# Patient Record
Sex: FEMALE | Race: WHITE | NOT HISPANIC OR LATINO | Employment: UNEMPLOYED | URBAN - METROPOLITAN AREA
[De-identification: names, ages, dates, MRNs, and addresses within clinical notes are randomized per-mention and may not be internally consistent; named-entity substitution may affect disease eponyms.]

---

## 2021-05-13 ENCOUNTER — HOSPITAL ENCOUNTER (EMERGENCY)
Facility: HOSPITAL | Age: 56
Discharge: HOME | End: 2021-05-13
Attending: EMERGENCY MEDICINE
Payer: MEDICAID

## 2021-05-13 VITALS
HEART RATE: 80 BPM | RESPIRATION RATE: 18 BRPM | DIASTOLIC BLOOD PRESSURE: 74 MMHG | HEIGHT: 64 IN | BODY MASS INDEX: 15.36 KG/M2 | TEMPERATURE: 98 F | OXYGEN SATURATION: 99 % | WEIGHT: 90 LBS | SYSTOLIC BLOOD PRESSURE: 165 MMHG

## 2021-05-13 DIAGNOSIS — K52.9 GASTROENTERITIS: Primary | ICD-10-CM

## 2021-05-13 DIAGNOSIS — I10 HTN (HYPERTENSION): ICD-10-CM

## 2021-05-13 DIAGNOSIS — R07.9 CHEST PAIN: ICD-10-CM

## 2021-05-13 DIAGNOSIS — M79.89 LEG SWELLING: ICD-10-CM

## 2021-05-13 PROBLEM — J44.1 COPD EXACERBATION: Status: ACTIVE | Noted: 2019-08-16

## 2021-05-13 PROBLEM — C50.511 MALIGNANT NEOPLASM OF LOWER-OUTER QUADRANT OF RIGHT FEMALE BREAST: Status: ACTIVE | Noted: 2018-10-20

## 2021-05-13 LAB
ALBUMIN SERPL-MCNC: 3.9 G/DL (ref 3.3–5.5)
ALBUMIN SERPL-MCNC: 4 G/DL (ref 3.3–5.5)
ALP SERPL-CCNC: 80 U/L (ref 42–141)
ALP SERPL-CCNC: 81 U/L (ref 42–141)
BILIRUB SERPL-MCNC: 0.3 MG/DL (ref 0.2–1.6)
BILIRUB SERPL-MCNC: 0.3 MG/DL (ref 0.2–1.6)
BUN SERPL-MCNC: 16 MG/DL (ref 7–22)
CALCIUM SERPL-MCNC: 11.6 MG/DL (ref 8–10.3)
CHLORIDE SERPL-SCNC: 103 MMOL/L (ref 98–108)
CREAT SERPL-MCNC: 1 MG/DL (ref 0.6–1.2)
CTP QC/QA: YES
GLUCOSE SERPL-MCNC: 104 MG/DL (ref 73–118)
POC ALT (SGPT): 14 U/L (ref 10–47)
POC ALT (SGPT): 7 U/L (ref 10–47)
POC AMYLASE: 31 U/L (ref 14–97)
POC AST (SGOT): 32 U/L (ref 11–38)
POC AST (SGOT): 37 U/L (ref 11–38)
POC B-TYPE NATRIURETIC PEPTIDE: 132 PG/ML (ref 0–100)
POC CARDIAC TROPONIN I: 0 NG/ML
POC CARDIAC TROPONIN I: 0.01 NG/ML
POC GGT: 34 U/L (ref 5–65)
POC PTINR: 1 (ref 0.9–1.2)
POC PTWBT: 12.1 SEC (ref 9.7–14.3)
POC TCO2: 32 MMOL/L (ref 18–33)
POTASSIUM BLD-SCNC: 4.5 MMOL/L (ref 3.6–5.1)
PROTEIN, POC: 6.6 G/DL (ref 6.4–8.1)
PROTEIN, POC: 7 G/DL (ref 6.4–8.1)
SAMPLE: NORMAL
SARS-COV-2 RDRP RESP QL NAA+PROBE: NEGATIVE
SODIUM BLD-SCNC: 141 MMOL/L (ref 128–145)

## 2021-05-13 PROCEDURE — 93005 ELECTROCARDIOGRAM TRACING: CPT | Mod: ER

## 2021-05-13 PROCEDURE — 80053 COMPREHEN METABOLIC PANEL: CPT | Mod: ER

## 2021-05-13 PROCEDURE — 96374 THER/PROPH/DIAG INJ IV PUSH: CPT | Mod: ER

## 2021-05-13 PROCEDURE — 93010 ELECTROCARDIOGRAM REPORT: CPT | Mod: ,,, | Performed by: INTERNAL MEDICINE

## 2021-05-13 PROCEDURE — 93010 EKG 12-LEAD: ICD-10-PCS | Mod: ,,, | Performed by: INTERNAL MEDICINE

## 2021-05-13 PROCEDURE — 83880 ASSAY OF NATRIURETIC PEPTIDE: CPT | Mod: ER

## 2021-05-13 PROCEDURE — 84484 ASSAY OF TROPONIN QUANT: CPT | Mod: ER

## 2021-05-13 PROCEDURE — 82150 ASSAY OF AMYLASE: CPT | Mod: ER

## 2021-05-13 PROCEDURE — U0002 COVID-19 LAB TEST NON-CDC: HCPCS | Mod: ER | Performed by: EMERGENCY MEDICINE

## 2021-05-13 PROCEDURE — 99285 EMERGENCY DEPT VISIT HI MDM: CPT | Mod: 25,ER

## 2021-05-13 PROCEDURE — 85610 PROTHROMBIN TIME: CPT | Mod: ER

## 2021-05-13 PROCEDURE — 25000242 PHARM REV CODE 250 ALT 637 W/ HCPCS: Mod: ER | Performed by: EMERGENCY MEDICINE

## 2021-05-13 PROCEDURE — 25000003 PHARM REV CODE 250: Mod: ER | Performed by: EMERGENCY MEDICINE

## 2021-05-13 PROCEDURE — 25500020 PHARM REV CODE 255: Mod: ER | Performed by: EMERGENCY MEDICINE

## 2021-05-13 PROCEDURE — 85025 COMPLETE CBC W/AUTO DIFF WBC: CPT | Mod: ER

## 2021-05-13 PROCEDURE — 63600175 PHARM REV CODE 636 W HCPCS: Mod: ER | Performed by: EMERGENCY MEDICINE

## 2021-05-13 PROCEDURE — 81003 URINALYSIS AUTO W/O SCOPE: CPT | Mod: ER

## 2021-05-13 RX ORDER — FAMOTIDINE 20 MG/1
20 TABLET, FILM COATED ORAL 2 TIMES DAILY
Qty: 20 TABLET | Refills: 0 | OUTPATIENT
Start: 2021-05-13 | End: 2022-02-22

## 2021-05-13 RX ORDER — ASPIRIN 325 MG
325 TABLET ORAL
Status: COMPLETED | OUTPATIENT
Start: 2021-05-13 | End: 2021-05-13

## 2021-05-13 RX ORDER — ACETAMINOPHEN 500 MG
1000 TABLET ORAL EVERY 6 HOURS PRN
Qty: 30 TABLET | Refills: 0 | OUTPATIENT
Start: 2021-05-13 | End: 2022-02-22

## 2021-05-13 RX ORDER — HYDRALAZINE HYDROCHLORIDE 20 MG/ML
10 INJECTION INTRAMUSCULAR; INTRAVENOUS
Status: COMPLETED | OUTPATIENT
Start: 2021-05-13 | End: 2021-05-13

## 2021-05-13 RX ORDER — IBUPROFEN 600 MG/1
600 TABLET ORAL EVERY 6 HOURS PRN
Qty: 20 TABLET | Refills: 0 | OUTPATIENT
Start: 2021-05-13 | End: 2022-02-22

## 2021-05-13 RX ORDER — NITROGLYCERIN 0.4 MG/1
0.4 TABLET SUBLINGUAL EVERY 5 MIN PRN
Status: DISCONTINUED | OUTPATIENT
Start: 2021-05-13 | End: 2021-05-13 | Stop reason: HOSPADM

## 2021-05-13 RX ORDER — ONDANSETRON 8 MG/1
8 TABLET, ORALLY DISINTEGRATING ORAL EVERY 6 HOURS PRN
Qty: 20 TABLET | Refills: 0 | Status: SHIPPED | OUTPATIENT
Start: 2021-05-13 | End: 2021-05-15

## 2021-05-13 RX ORDER — DICYCLOMINE HYDROCHLORIDE 20 MG/1
20 TABLET ORAL 3 TIMES DAILY PRN
Qty: 20 TABLET | Refills: 0 | Status: SHIPPED | OUTPATIENT
Start: 2021-05-13 | End: 2021-06-12

## 2021-05-13 RX ORDER — PROMETHAZINE HYDROCHLORIDE 25 MG/1
25 SUPPOSITORY RECTAL EVERY 6 HOURS PRN
Qty: 10 SUPPOSITORY | Refills: 0 | OUTPATIENT
Start: 2021-05-13 | End: 2022-02-22

## 2021-05-13 RX ADMIN — IOHEXOL 100 ML: 350 INJECTION, SOLUTION INTRAVENOUS at 12:05

## 2021-05-13 RX ADMIN — HYDRALAZINE HYDROCHLORIDE 10 MG: 20 INJECTION INTRAMUSCULAR; INTRAVENOUS at 02:05

## 2021-05-13 RX ADMIN — NITROGLYCERIN 0.4 MG: 0.4 TABLET, ORALLY DISINTEGRATING SUBLINGUAL at 11:05

## 2021-05-13 RX ADMIN — NITROGLYCERIN 1 INCH: 20 OINTMENT TOPICAL at 01:05

## 2021-05-13 RX ADMIN — ASPIRIN 325 MG ORAL TABLET 325 MG: 325 PILL ORAL at 10:05

## 2021-05-13 RX ADMIN — NITROGLYCERIN 0.4 MG: 0.4 TABLET, ORALLY DISINTEGRATING SUBLINGUAL at 10:05

## 2022-02-22 ENCOUNTER — HOSPITAL ENCOUNTER (EMERGENCY)
Facility: HOSPITAL | Age: 57
Discharge: HOME | End: 2022-02-22
Attending: EMERGENCY MEDICINE
Payer: MEDICARE

## 2022-02-22 VITALS
RESPIRATION RATE: 20 BRPM | DIASTOLIC BLOOD PRESSURE: 83 MMHG | TEMPERATURE: 98 F | BODY MASS INDEX: 17.07 KG/M2 | HEART RATE: 102 BPM | OXYGEN SATURATION: 97 % | HEIGHT: 64 IN | SYSTOLIC BLOOD PRESSURE: 162 MMHG | WEIGHT: 100 LBS

## 2022-02-22 DIAGNOSIS — J30.9 ALLERGIC RHINITIS, UNSPECIFIED SEASONALITY, UNSPECIFIED TRIGGER: ICD-10-CM

## 2022-02-22 DIAGNOSIS — J02.9 SORE THROAT: ICD-10-CM

## 2022-02-22 DIAGNOSIS — B34.9 VIRAL ILLNESS: Primary | ICD-10-CM

## 2022-02-22 LAB
CTP QC/QA: YES
INFLUENZA A ANTIGEN, POC: NEGATIVE
INFLUENZA B ANTIGEN, POC: NEGATIVE
POC RAPID STREP A: NEGATIVE
POC RAPID STREP A: NEGATIVE
SARS-COV-2 RDRP RESP QL NAA+PROBE: NEGATIVE

## 2022-02-22 PROCEDURE — U0002 COVID-19 LAB TEST NON-CDC: HCPCS | Mod: ER | Performed by: PHYSICIAN ASSISTANT

## 2022-02-22 PROCEDURE — 99284 EMERGENCY DEPT VISIT MOD MDM: CPT | Mod: 25,ER

## 2022-02-22 PROCEDURE — 87804 INFLUENZA ASSAY W/OPTIC: CPT | Mod: ER

## 2022-02-22 RX ORDER — IBUPROFEN 600 MG/1
600 TABLET ORAL EVERY 6 HOURS PRN
Qty: 20 TABLET | Refills: 0 | Status: SHIPPED | OUTPATIENT
Start: 2022-02-22 | End: 2022-02-27

## 2022-02-22 RX ORDER — LORATADINE 10 MG/1
10 TABLET ORAL DAILY
Qty: 30 TABLET | Refills: 0 | Status: SHIPPED | OUTPATIENT
Start: 2022-02-22 | End: 2022-03-24

## 2022-02-22 RX ORDER — DEXTROMETHORPHAN HYDROBROMIDE, GUAIFENESIN 5; 100 MG/5ML; MG/5ML
650 LIQUID ORAL EVERY 8 HOURS
Qty: 20 TABLET | Refills: 0 | Status: SHIPPED | OUTPATIENT
Start: 2022-02-22 | End: 2022-02-27

## 2022-02-22 RX ORDER — FLUTICASONE PROPIONATE 50 MCG
1 SPRAY, SUSPENSION (ML) NASAL 2 TIMES DAILY PRN
Qty: 15 G | Refills: 0 | Status: SHIPPED | OUTPATIENT
Start: 2022-02-22 | End: 2022-03-08

## 2022-02-22 NOTE — FIRST PROVIDER EVALUATION
Emergency Department TeleTriage Encounter Note      CHIEF COMPLAINT    Chief Complaint   Patient presents with    Sore Throat     X 1 week, sinus, occasional cough       VITAL SIGNS   Initial Vitals [02/22/22 1724]   BP Pulse Resp Temp SpO2   (!) 182/97 (!) 114 20 98.6 °F (37 °C) 95 %      MAP       --            ALLERGIES    Review of patient's allergies indicates:  No Known Allergies    PROVIDER TRIAGE NOTE  This is a teletriage evaluation of a 56 y.o. female presenting to the ED complaining of sore throat, cough, congestion x 1 week.  She denies fever.  She is vaccinated for covid and did get a flu shot.     Initial orders will be placed and care will be transferred to an alternate provider when patient is roomed for a full evaluation. Any additional orders and the final disposition will be determined by that provider.           ORDERS  Labs Reviewed - No data to display    ED Orders (720h ago, onward)    Start Ordered     Status Ordering Provider    02/22/22 1739 02/22/22 1738  POCT COVID-19 Rapid Screening  Once         Ordered WILBERT VANCE    02/22/22 1739 02/22/22 1738  Airborne and Contact and Droplet Isolation Status  Continuous         Ordered WILBERT VANCE    02/22/22 1739 02/22/22 1738  POCT Influenza A/B Molecular  Once         Ordered WILBERT VANCE            Virtual Visit Note: The provider triage portion of this emergency department evaluation and documentation was performed via [employer], a HIPAA-compliant telemedicine application, in concert with a tele-presenter in the room. A face to face patient evaluation with one of my colleagues will occur once the patient is placed in an emergency department room.      DISCLAIMER: This note was prepared with [employer] voice recognition transcription software. Garbled syntax, mangled pronouns, and other bizarre constructions may be attributed to that software system.

## 2022-02-23 NOTE — ED PROVIDER NOTES
Encounter Date: 2/22/2022    SCRIBE #1 NOTE: I, Jenna Lieberman, am scribing for, and in the presence of,  RODOLFO Shanks. I have scribed the following portions of the note - Other sections scribed: HPI; ROS; PE.       History     Chief Complaint   Patient presents with    Sore Throat     X 1 week, sinus, occasional cough     AMAN AGRAWAL is a 56 y.o. female with Hx of HTN, fibromyalgia, and breast cancer who presents to the ED for chief complaint of sore throat onset 1 week. Patient denies rash, fever, chest pain, SOB, numbness, weakness, tingling, abdominal pain, back pain, dysuria, hematuria, nausea, vomiting, diarrhea, or any other complaints.  Patient rates the pain as 4/10 and has not taken any medications for the symptoms. No known drug allergies. She denies tobacco use, alcohol use, or recreational drug use. No alleviating/aggravating factors.      The history is provided by the patient. No  was used.     Review of patient's allergies indicates:  No Known Allergies  Past Medical History:   Diagnosis Date    Breast cancer     Fibromyalgia     Hypertension      Past Surgical History:   Procedure Laterality Date    BREAST BIOPSY      BREAST CYST EXCISION      CERVICAL BIOPSY  W/ LOOP ELECTRODE EXCISION       Family History   Problem Relation Age of Onset    Breast cancer Neg Hx     Colon cancer Neg Hx     Ovarian cancer Neg Hx      Social History     Tobacco Use    Smoking status: Light Tobacco Smoker     Types: Cigarettes    Smokeless tobacco: Never Used   Substance Use Topics    Alcohol use: No    Drug use: No     Review of Systems   Constitutional: Negative for chills and fever.   HENT: Positive for sore throat. Negative for congestion, ear pain, rhinorrhea and trouble swallowing.    Eyes: Negative for pain, discharge and redness.   Respiratory: Negative for cough and shortness of breath.    Cardiovascular: Negative for chest pain.   Gastrointestinal: Negative for  abdominal pain, diarrhea, nausea and vomiting.   Genitourinary: Negative for decreased urine volume and dysuria.   Musculoskeletal: Negative for back pain, neck pain and neck stiffness.   Skin: Negative for rash.   Neurological: Negative for dizziness, weakness, light-headedness, numbness and headaches.   Psychiatric/Behavioral: Negative for confusion.       Physical Exam     Initial Vitals [02/22/22 1724]   BP Pulse Resp Temp SpO2   (!) 182/97 (!) 114 20 98.6 °F (37 °C) 95 %      MAP       --         Physical Exam    Nursing note and vitals reviewed.  Constitutional: Vital signs are normal. She appears well-developed.  Non-toxic appearance. She does not appear ill.   HENT:   Head: Normocephalic and atraumatic.   Right Ear: Tympanic membrane and ear canal normal.   Left Ear: Tympanic membrane and ear canal normal.   Nose: Mucosal edema present.   Mouth/Throat: Uvula is midline and mucous membranes are normal. Posterior oropharyngeal erythema present. No oropharyngeal exudate, posterior oropharyngeal edema or tonsillar abscesses.   Postnasal drip present.   Eyes: Conjunctivae are normal.   Neck:   Normal range of motion.  Cardiovascular: Normal rate, regular rhythm, normal heart sounds and intact distal pulses. Exam reveals no gallop and no friction rub.    No murmur heard.  Pulmonary/Chest: Effort normal and breath sounds normal. No respiratory distress. She has no wheezes. She has no rhonchi. She has no rales. She exhibits no tenderness.   Abdominal: Abdomen is soft. There is no abdominal tenderness.   Musculoskeletal:      Cervical back: Normal range of motion.     Lymphadenopathy:     She has no cervical adenopathy.   Neurological: She is alert and oriented to person, place, and time. Gait normal. GCS eye subscore is 4. GCS verbal subscore is 5. GCS motor subscore is 6.   Skin: Skin is warm, dry and intact. No rash noted.   Psychiatric: She has a normal mood and affect. Her speech is normal and behavior is  normal. Judgment and thought content normal.         ED Course   Procedures  Labs Reviewed   SARS-COV-2 RDRP GENE    Narrative:     This test utilizes isothermal nucleic acid amplification   technology to detect the SARS-CoV-2 RdRp nucleic acid segment.   The analytical sensitivity (limit of detection) is 125 genome   equivalents/mL.   A POSITIVE result implies infection with the SARS-CoV-2 virus;   the patient is presumed to be contagious.     A NEGATIVE result means that SARS-CoV-2 nucleic acids are not   present above the limit of detection. A NEGATIVE result should be   treated as presumptive. It does not rule out the possibility of   COVID-19 and should not be the sole basis for treatment decisions.   If COVID-19 is strongly suspected based on clinical and exposure   history, re-testing using an alternate molecular assay should be   considered.   This test is only for use under the Food and Drug   Administration s Emergency Use Authorization (EUA).   Commercial kits are provided by [employer].   Performance characteristics of the EUA have been independently   verified by [facility] Department of   Pathology and Laboratory Medicine.   _________________________________________________________________   The authorized Fact Sheet for Healthcare Providers and the authorized Fact   Sheet for Patients of the ID NOW COVID-19 are available on the FDA   website:     https://www.fda.gov/media/811941/download  https://www.fda.gov/media/261702/download           POCT INFLUENZA A/B MOLECULAR   POCT STREP A MOLECULAR   POCT RAPID INFLUENZA A/B   POCT STREP A, RAPID   POCT STREP A, RAPID          Imaging Results    None          Medications - No data to display  Medical Decision Making:   History:   Old Medical Records: I decided to obtain old medical records.  Clinical Tests:   Lab Tests: Ordered and Reviewed       APC / Resident Notes:   This is an evaluation of a 56 y.o. female that presents to the  Emergency Department for sore throat. The patient is a non-toxic, afebrile, and well appearing female. On physical exam: Ears: without infection.  Pharynx with erythema without exudates. Appears well hydrated with moist mucus membranes. Neck soft and supple with no meningeal signs or cervical lymphadenopathy. Breath sounds are clear and equal bilaterally with no adventitious breath sounds, tachypnea or respiratory distress with room air pulse ox of 97% and no evidence of hypoxia.     Vital Signs Are Reassuring. RESULTS: COVID, Strep, and Influenza negative    My overall impression is Viral illness, allergic rhinitis. I considered, but at this time, do not suspect OM, OE, strep pharyngitis, meningitis, pneumonia, bacterial sinusitis, or significant dehydration requiring IV fluids or admission.    D/C Meds: Claritin, Flonase. D/C Information: Tylenol/Ibuprofen PRN, Hydration. The diagnosis, treatment plan, instructions for follow-up and reevaluation with Primary Care as well as ED return precautions were discussed and understanding was verbalized. All questions or concerns have been addressed.        Scribe Attestation:   Scribe #1: I performed the above scribed service and the documentation accurately describes the services I performed. I attest to the accuracy of the note.               I, RODOLFO Shanks, personally performed the services described in this documentation.  All medical record entries made by the scribe were at my direction and in my presence.  I have reviewed the chart and agree that the record reflects my personal performance and is accurate and complete.  Clinical Impression:   Final diagnoses:  [B34.9] Viral illness (Primary)  [J02.9] Sore throat  [J30.9] Allergic rhinitis, unspecified seasonality, unspecified trigger          ED Disposition Condition    Discharge Stable        ED Prescriptions     Medication Sig Dispense Start Date End Date Auth. Provider    acetaminophen (TYLENOL) 650 MG TbSR  Take 1 tablet (650 mg total) by mouth every 8 (eight) hours. for 5 days 20 tablet 2/22/2022 2/27/2022 NADIA Cameron    ibuprofen (ADVIL,MOTRIN) 600 MG tablet Take 1 tablet (600 mg total) by mouth every 6 (six) hours as needed for Pain. 20 tablet 2/22/2022 2/27/2022 NADIA Cameron    loratadine (CLARITIN) 10 mg tablet Take 1 tablet (10 mg total) by mouth once daily. 30 tablet 2/22/2022 3/24/2022 NADIA Cameron    fluticasone propionate (FLONASE) 50 mcg/actuation nasal spray 1 spray (50 mcg total) by Each Nostril route 2 (two) times daily as needed for Rhinitis or Allergies. 15 g 2/22/2022 3/8/2022 NADIA Cameron        Follow-up Information     Follow up With Specialties Details Why Contact Info    [facility]  Schedule an appointment as soon as possible for a visit in 2 days  [address] [place] [address]  [phone]      [facility] ED Emergency Medicine Go to  If symptoms worsen [address] [facility] [address] [phone]           NADIA Cameron  02/22/22 2010

## 2022-07-17 ENCOUNTER — HOSPITAL ENCOUNTER (EMERGENCY)
Facility: HOSPITAL | Age: 57
Discharge: LEFT BEFORE DISCHARGE | End: 2022-07-17
Attending: EMERGENCY MEDICINE
Payer: MEDICARE

## 2022-07-17 VITALS
HEIGHT: 64 IN | HEART RATE: 76 BPM | RESPIRATION RATE: 20 BRPM | DIASTOLIC BLOOD PRESSURE: 74 MMHG | WEIGHT: 87 LBS | SYSTOLIC BLOOD PRESSURE: 133 MMHG | OXYGEN SATURATION: 100 % | BODY MASS INDEX: 14.85 KG/M2 | TEMPERATURE: 98 F

## 2022-07-17 DIAGNOSIS — T07.XXXA MULTIPLE EXCORIATIONS: ICD-10-CM

## 2022-07-17 DIAGNOSIS — E87.6 HYPOKALEMIA: Primary | ICD-10-CM

## 2022-07-17 DIAGNOSIS — R23.3 PETECHIAL RASH: ICD-10-CM

## 2022-07-17 DIAGNOSIS — M25.571 RIGHT ANKLE PAIN: ICD-10-CM

## 2022-07-17 LAB
ALBUMIN SERPL-MCNC: 3.6 G/DL (ref 3.3–5.5)
ALBUMIN SERPL-MCNC: 4.1 G/DL (ref 3.3–5.5)
ALP SERPL-CCNC: 102 U/L (ref 42–141)
ALP SERPL-CCNC: 91 U/L (ref 42–141)
BILIRUB SERPL-MCNC: 0.4 MG/DL (ref 0.2–1.6)
BILIRUB SERPL-MCNC: 0.6 MG/DL (ref 0.2–1.6)
BILIRUBIN, POC UA: NEGATIVE
BLOOD, POC UA: NEGATIVE
BUN SERPL-MCNC: 13 MG/DL (ref 7–22)
BUN SERPL-MCNC: 14 MG/DL (ref 7–22)
CALCIUM SERPL-MCNC: 9.1 MG/DL (ref 8–10.3)
CALCIUM SERPL-MCNC: 9.9 MG/DL (ref 8–10.3)
CHLORIDE SERPL-SCNC: 87 MMOL/L (ref 98–108)
CHLORIDE SERPL-SCNC: 92 MMOL/L (ref 98–108)
CLARITY, POC UA: CLEAR
COLOR, POC UA: YELLOW
CREAT SERPL-MCNC: 0.6 MG/DL (ref 0.6–1.2)
CREAT SERPL-MCNC: 0.7 MG/DL (ref 0.6–1.2)
GLUCOSE SERPL-MCNC: 102 MG/DL (ref 73–118)
GLUCOSE SERPL-MCNC: 153 MG/DL (ref 73–118)
GLUCOSE, POC UA: NEGATIVE
KETONES, POC UA: NEGATIVE
LEUKOCYTE EST, POC UA: NEGATIVE
NITRITE, POC UA: NEGATIVE
PH UR STRIP: 6 [PH]
POC ALT (SGPT): 32 U/L (ref 10–47)
POC ALT (SGPT): 33 U/L (ref 10–47)
POC AST (SGOT): 48 U/L (ref 11–38)
POC AST (SGOT): 62 U/L (ref 11–38)
POC PTINR: 1.2 (ref 0.9–1.2)
POC PTWBT: 14.1 SEC (ref 9.7–14.3)
POC TCO2: 30 MMOL/L (ref 18–33)
POC TCO2: 32 MMOL/L (ref 18–33)
POTASSIUM BLD-SCNC: 2.5 MMOL/L (ref 3.6–5.1)
POTASSIUM BLD-SCNC: 4.3 MMOL/L (ref 3.6–5.1)
PROTEIN, POC UA: NEGATIVE
PROTEIN, POC: 6.5 G/DL (ref 6.4–8.1)
PROTEIN, POC: 7.6 G/DL (ref 6.4–8.1)
SAMPLE: NORMAL
SODIUM BLD-SCNC: 132 MMOL/L (ref 128–145)
SODIUM BLD-SCNC: 133 MMOL/L (ref 128–145)
SPECIFIC GRAVITY, POC UA: 1.02
UROBILINOGEN, POC UA: 0.2 E.U./DL

## 2022-07-17 PROCEDURE — 96365 THER/PROPH/DIAG IV INF INIT: CPT | Mod: ER

## 2022-07-17 PROCEDURE — 81003 URINALYSIS AUTO W/O SCOPE: CPT | Mod: ER

## 2022-07-17 PROCEDURE — 25000003 PHARM REV CODE 250: Mod: ER | Performed by: EMERGENCY MEDICINE

## 2022-07-17 PROCEDURE — 85610 PROTHROMBIN TIME: CPT | Mod: ER

## 2022-07-17 PROCEDURE — 80053 COMPREHEN METABOLIC PANEL: CPT | Mod: ER,91

## 2022-07-17 PROCEDURE — 85025 COMPLETE CBC W/AUTO DIFF WBC: CPT | Mod: ER

## 2022-07-17 PROCEDURE — 63600175 PHARM REV CODE 636 W HCPCS: Mod: ER | Performed by: EMERGENCY MEDICINE

## 2022-07-17 PROCEDURE — 99291 CRITICAL CARE FIRST HOUR: CPT | Mod: 25,ER

## 2022-07-17 RX ORDER — POTASSIUM CHLORIDE 7.45 MG/ML
10 INJECTION INTRAVENOUS ONCE
Status: COMPLETED | OUTPATIENT
Start: 2022-07-17 | End: 2022-07-17

## 2022-07-17 RX ORDER — POTASSIUM CHLORIDE 20 MEQ/1
40 TABLET, EXTENDED RELEASE ORAL
Status: COMPLETED | OUTPATIENT
Start: 2022-07-17 | End: 2022-07-17

## 2022-07-17 RX ADMIN — POTASSIUM CHLORIDE 40 MEQ: 20 TABLET, EXTENDED RELEASE ORAL at 09:07

## 2022-07-17 RX ADMIN — POTASSIUM CHLORIDE 10 MEQ: 7.46 INJECTION, SOLUTION INTRAVENOUS at 09:07

## 2022-07-17 RX ADMIN — SODIUM CHLORIDE 1000 ML: 0.9 INJECTION, SOLUTION INTRAVENOUS at 09:07

## 2022-07-17 NOTE — LETTER
Patient: AMAN AGRAWAL  YOB: 1965  Date: 7/17/2022 Time: 11:04 PM  Location: [facility]    Leaving the Hospital Against Medical Advice    Chart #:[id number]    This will certify that I, the undersigned,    ______________________________________________________________________    A patient in the above named medical center, having requested discharge and removal from the medical center against the advice of my attending physician(s), hereby release [facility], its physicians, officers and employees, severally and individually, from any and all liability of any nature whatsoever for any injury or harm or complication of any kind that may result directly or indirectly, by reason of my terminating my stay as a patient at [facility] and my departure from [facility], and hereby waive any and all rights of action I may now have or later acquire as a result of my voluntary departure from [facility] and the termination of my stay as a patient therein.    This release is made with the full knowledge of the danger that may result from the action which I am taking.      Date:_______________________                         ___________________________                                                                                    Patient/Legal Representative    Witness:        ____________________________                          ___________________________  Nurse                                                                        Physician

## 2022-07-17 NOTE — FIRST PROVIDER EVALUATION
"Medical screening exam completed.  I have conducted a focused provider triage encounter, findings are as follows:    Brief history of present illness:  Multiple wounds to BUE and BLE with scabbing; surround rash noted    Vitals:    07/17/22 1737   BP: (!) 148/79   Pulse: 86   Resp: 20   Temp: 98.4 °F (36.9 °C)   TempSrc: Oral   SpO2: 97%   Weight: 39.5 kg (87 lb)   Height: 5' 4" (1.626 m)       Pertinent physical exam:  NAD    Brief workup plan:  Will await further evaluation in a treatment room    Preliminary workup initiated; this workup will be continued and followed by the physician or advanced practice provider that is assigned to the patient when roomed.  "

## 2022-07-18 NOTE — ED NOTES
Patient was seen leaving ER with IV. I went outside to find the patient and she was not in the parking lot. Patient's mother has dementia and was left in room.

## 2022-07-18 NOTE — ED PROVIDER NOTES
Encounter Date: 7/17/2022    SCRIBE #1 NOTE: I, Khushboo Patel, am scribing for, and in the presence of,  Dora Chino MD. I have scribed the following portions of the note - Other sections scribed: HPI, ROS, PE.       History     Chief Complaint   Patient presents with    Wound Check     Reports fall 3 weeks ago, had abrasion to left forearm, now has multiple lesions to arms.     57 y.o. female with Hx of HTN, Fibromyalgia, and Breast cancer who presents to the ED with chief complaint of chronic scratches to the bilateral forearms for 3 weeks with acute rash that has appeared after fall 3 weeks ago. Patient initially fell out an 18 gaitan and scratched her left forearm and hit the front of her head; she did not seek medical attention. She has been cleaning forearm abrasions with peroxide daily and applying Neosporin. Last week, the patient tripped over a parking curb and scratched her right knee and right forearm. Knee pain is improving with bedrest. She now notes small areas of discoloration and rash, spreading on her bilateral forearms around the existing abrasions. Rash is not present on her legs. Patient also endorses intermittent shortness of breath and bruising to the forehead. Denies new onset myalgias, fever, chest pain, or neck pain. Last tetanus was 5 years ago. Last chemo treatment was 4 years ago. Endorses smoking 3 cigarettes daily.     The history is provided by the patient. No  was used.     Review of patient's allergies indicates:  No Known Allergies  Past Medical History:   Diagnosis Date    Breast cancer     Fibromyalgia     Hypertension      Past Surgical History:   Procedure Laterality Date    BREAST BIOPSY      BREAST CYST EXCISION      CERVICAL BIOPSY  W/ LOOP ELECTRODE EXCISION       Family History   Problem Relation Age of Onset    Breast cancer Neg Hx     Colon cancer Neg Hx     Ovarian cancer Neg Hx      Social History     Tobacco Use    Smoking status: Light  Tobacco Smoker     Types: Cigarettes    Smokeless tobacco: Never Used   Substance Use Topics    Alcohol use: No    Drug use: No     Review of Systems   Constitutional: Negative for fever.   Respiratory: Positive for shortness of breath.    Cardiovascular: Negative for chest pain.   Musculoskeletal: Negative for neck pain. Myalgias: chronic.   Skin: Positive for color change (bruising), rash and wound.   All other systems reviewed and are negative.      Physical Exam     Initial Vitals [07/17/22 1737]   BP Pulse Resp Temp SpO2   (!) 148/79 86 20 98.4 °F (36.9 °C) 97 %      MAP       --         Physical Exam    Nursing note and vitals reviewed.  Constitutional: She appears well-developed and well-nourished.   HENT:   Head: Normocephalic and atraumatic.   Right Ear: External ear normal.   Left Ear: External ear normal.   Eyes: Conjunctivae are normal.   Neck: Phonation normal. Neck supple.   Normal range of motion.  Cardiovascular: Normal rate and intact distal pulses.   Pulses:       Dorsalis pedis pulses are 2+ on the right side and 2+ on the left side.   Pulmonary/Chest: Effort normal. No stridor. No respiratory distress.   Abdominal: Abdomen is soft. There is no abdominal tenderness.   Musculoskeletal:         General: No tenderness or edema.      Cervical back: Normal range of motion and neck supple.      Comments: No edema in bilateral forearms. ROM normal.      Neurological: She is alert and oriented to person, place, and time.   Skin: Skin is warm, dry and intact. No rash noted.   Multiple excoriations to the bilateral forearms with overlying scabbing and trace surrounding erythema. No induration. Bilateral forearms with patchy macular erythema, non-blanching with excoriations (see image).  Subungual hematoma to right thumb (chronic). Nails intact.   Ecchymosis to bilateral frontal forehead. No bony tenderness. Skin intact.   Psychiatric: She has a normal mood and affect. Her behavior is normal.                          ED Course   Critical Care    Date/Time: 7/17/2022 11:13 PM  Performed by: Dora Chino MD  Authorized by: Dora Chino MD   Direct patient critical care time: 10 minutes  Additional history critical care time: 10 minutes  Ordering / reviewing critical care time: 10 minutes  Documentation critical care time: 10 minutes  Total critical care time (exclusive of procedural time) : 40 minutes  Critical care was necessary to treat or prevent imminent or life-threatening deterioration of the following conditions: metabolic crisis.  Critical care was time spent personally by me on the following activities: discussions with consultants, development of treatment plan with patient or surrogate, examination of patient, ordering and performing treatments and interventions, re-evaluation of patient's condition, ordering and review of laboratory studies, obtaining history from patient or surrogate, evaluation of patient's response to treatment, review of old charts, pulse oximetry and ordering and review of radiographic studies.        Labs Reviewed   POCT CMP - Abnormal; Notable for the following components:       Result Value    AST (SGOT), POC 48 (*)     POC Chloride 87 (*)     POC Glucose 153 (*)     POC Potassium 2.5 (*)     All other components within normal limits   POCT CMP - Abnormal; Notable for the following components:    AST (SGOT), POC 62 (*)     POC Chloride 92 (*)     All other components within normal limits   POCT CBC   POCT URINALYSIS W/O SCOPE   POCT URINALYSIS W/O SCOPE   POCT CMP   POCT PROTIME-INR   ISTAT PROCEDURE   POCT CMP          Imaging Results          X-Ray Ankle Complete Right (Final result)  Result time 07/17/22 21:41:15    Final result by Africa Rosales MD (07/17/22 21:41:15)                 Impression:      No acute osseous abnormality identified.      Electronically signed by: Africa Rosales MD  Date:    07/17/2022  Time:    21:41             Narrative:     EXAMINATION:  XR ANKLE COMPLETE 3 VIEW RIGHT    CLINICAL HISTORY:  Pain in right ankle and joints of right foot    TECHNIQUE:  AP, lateral, and oblique images of the right ankle were performed.    COMPARISON:  None    FINDINGS:  No evidence of acute displaced fracture, dislocation, or osseous destructive process.  Ankle mortise is maintained.                               CT Head Without Contrast (Final result)  Result time 07/17/22 21:36:40    Final result by Africa Rosales MD (07/17/22 21:36:40)                 Impression:      No acute intracranial abnormalities identified.      Electronically signed by: Africa Rosales MD  Date:    07/17/2022  Time:    21:36             Narrative:    EXAMINATION:  CT HEAD WITHOUT CONTRAST    CLINICAL HISTORY:  Facial trauma, blunt;    TECHNIQUE:  Low dose axial images were obtained through the head.  Coronal and sagittal reformations were also performed. Contrast was not administered.  Rapid AI screening was performed.    COMPARISON:  None.    FINDINGS:  No evidence of acute/recent major vascular distribution cerebral infarction, intraparenchymal hemorrhage, or intra-axial space occupying lesion. The ventricular system is normal in size and configuration with no evidence of hydrocephalus. No effacement of the skull-base cisterns. No abnormal extra-axial fluid collections or blood products. Visualized paranasal sinuses and mastoid air cells are clear. The calvarium shows no significant abnormality.                                 Medications   potassium chloride SA CR tablet 40 mEq (40 mEq Oral Given 7/17/22 2146)   potassium chloride 10 mEq in 100 mL IVPB (0 mEq Intravenous Stopped 7/17/22 2245)   sodium chloride 0.9% bolus 1,000 mL (0 mLs Intravenous Stopped 7/17/22 2245)     Medical Decision Making:   History:   Old Medical Records: I decided to obtain old medical records.  Clinical Tests:   Lab Tests: Ordered and Reviewed  Radiological Study: Ordered and Reviewed    Labs  Reviewed            Admission on 07/17/2022   Component Date Value Ref Range Status    POC PTWBT 07/17/2022 14.1  9.7 - 14.3 sec Final    POC PTINR 07/17/2022 1.2  0.9 - 1.2 Final    Sample 07/17/2022 unknown   Final    Glucose, UA 07/17/2022 Negative   Final    Bilirubin, UA 07/17/2022 Negative   Final    Ketones, UA 07/17/2022 Negative   Final    Spec Grav UA 07/17/2022 1.025   Final    Blood, UA 07/17/2022 Negative   Final    PH, UA 07/17/2022 6.0   Final    Protein, UA 07/17/2022 Negative   Final    Urobilinogen, UA 07/17/2022 0.2  E.U./dL Final    Nitrite, UA 07/17/2022 Negative   Final    Leukocytes, UA 07/17/2022 Negative   Final    Color, UA 07/17/2022 Yellow   Final    Clarity, UA 07/17/2022 Clear   Final    Albumin, POC 07/17/2022 4.1  3.3 - 5.5 g/dL Final    Alkaline Phosphatase, POC 07/17/2022 102  42 - 141 U/L Final    ALT (SGPT), POC 07/17/2022 32  10 - 47 U/L Final    AST (SGOT), POC 07/17/2022 48 (A) 11 - 38 U/L Final    POC BUN 07/17/2022 14  7 - 22 mg/dL Final    Calcium, POC 07/17/2022 9.9  8.0 - 10.3 mg/dL Final    POC Chloride 07/17/2022 87 (A) 98 - 108 mmol/L Final    POC Creatinine 07/17/2022 0.7  0.6 - 1.2 mg/dL Final    POC Glucose 07/17/2022 153 (A) 73 - 118 mg/dL Final    POC Potassium 07/17/2022 2.5 (A) 3.6 - 5.1 mmol/L Final    POC Sodium 07/17/2022 133  128 - 145 mmol/L Final    Bilirubin, POC 07/17/2022 0.6  0.2 - 1.6 mg/dL Final    POC TCO2 07/17/2022 32  18 - 33 mmol/L Final    Protein, POC 07/17/2022 7.6  6.4 - 8.1 g/dL Final    Albumin, POC 07/17/2022 3.6  3.3 - 5.5 g/dL Final    Alkaline Phosphatase, POC 07/17/2022 91  42 - 141 U/L Final    ALT (SGPT), POC 07/17/2022 33  10 - 47 U/L Final    AST (SGOT), POC 07/17/2022 62 (A) 11 - 38 U/L Final    POC BUN 07/17/2022 13  7 - 22 mg/dL Final    Calcium, POC 07/17/2022 9.1  8.0 - 10.3 mg/dL Final    POC Chloride 07/17/2022 92 (A) 98 - 108 mmol/L Final    POC Creatinine 07/17/2022 0.6  0.6 - 1.2 mg/dL  Final    POC Glucose 07/17/2022 102  73 - 118 mg/dL Final    POC Potassium 07/17/2022 4.3  3.6 - 5.1 mmol/L Final    POC Sodium 07/17/2022 132  128 - 145 mmol/L Final    Bilirubin, POC 07/17/2022 0.4  0.2 - 1.6 mg/dL Final    POC TCO2 07/17/2022 30  18 - 33 mmol/L Final    Protein, POC 07/17/2022 6.5  6.4 - 8.1 g/dL Final        Imaging Reviewed    Imaging Results          X-Ray Ankle Complete Right (Final result)  Result time 07/17/22 21:41:15    Final result by Africa Rosales MD (07/17/22 21:41:15)                 Impression:      No acute osseous abnormality identified.      Electronically signed by: Africa Rosales MD  Date:    07/17/2022  Time:    21:41             Narrative:    EXAMINATION:  XR ANKLE COMPLETE 3 VIEW RIGHT    CLINICAL HISTORY:  Pain in right ankle and joints of right foot    TECHNIQUE:  AP, lateral, and oblique images of the right ankle were performed.    COMPARISON:  None    FINDINGS:  No evidence of acute displaced fracture, dislocation, or osseous destructive process.  Ankle mortise is maintained.                               CT Head Without Contrast (Final result)  Result time 07/17/22 21:36:40    Final result by Africa Rosales MD (07/17/22 21:36:40)                 Impression:      No acute intracranial abnormalities identified.      Electronically signed by: Africa Rosales MD  Date:    07/17/2022  Time:    21:36             Narrative:    EXAMINATION:  CT HEAD WITHOUT CONTRAST    CLINICAL HISTORY:  Facial trauma, blunt;    TECHNIQUE:  Low dose axial images were obtained through the head.  Coronal and sagittal reformations were also performed. Contrast was not administered.  Rapid AI screening was performed.    COMPARISON:  None.    FINDINGS:  No evidence of acute/recent major vascular distribution cerebral infarction, intraparenchymal hemorrhage, or intra-axial space occupying lesion. The ventricular system is normal in size and configuration with no evidence of hydrocephalus.  No effacement of the skull-base cisterns. No abnormal extra-axial fluid collections or blood products. Visualized paranasal sinuses and mastoid air cells are clear. The calvarium shows no significant abnormality.                                Medications given in ED    Medications   potassium chloride SA CR tablet 40 mEq (40 mEq Oral Given 7/17/22 2146)   potassium chloride 10 mEq in 100 mL IVPB (0 mEq Intravenous Stopped 7/17/22 2245)   sodium chloride 0.9% bolus 1,000 mL (0 mLs Intravenous Stopped 7/17/22 2245)         Note was created using voice recognition software. Note may have occasional typographical errors that may not have been identified and edited despite good melissa initial review prior to signing.    I, Dora Chino MD, personally performed the services described in this documentation. All medical record entries made by the scribe were at my direction and in my presence.  I have reviewed the chart and agree that the record reflects my personal performance and is accurate and complete.          Scribe Attestation:   Scribe #1: I performed the above scribed service and the documentation accurately describes the services I performed. I attest to the accuracy of the note.        ED Course as of 07/17/22 2314   Sun Jul 17, 2022   2312 Patient signed out AMA prior to repeat CMP resulted. [DL]      ED Course User Index  [DL] Dora Chino MD             Clinical Impression:   Final diagnoses:  [M25.571] Right ankle pain  [E87.6] Hypokalemia (Primary)  [R23.3] Petechial rash  [T07.XXXA] Multiple excoriations          ED Disposition Condition    AMA               Dora Chino MD  07/22/22 0344

## 2022-08-12 ENCOUNTER — TELEPHONE (OUTPATIENT)
Dept: RESEARCH | Facility: HOSPITAL | Age: 57
End: 2022-08-12
Payer: MEDICARE
